# Patient Record
Sex: FEMALE | URBAN - METROPOLITAN AREA
[De-identification: names, ages, dates, MRNs, and addresses within clinical notes are randomized per-mention and may not be internally consistent; named-entity substitution may affect disease eponyms.]

---

## 2017-03-02 ENCOUNTER — OUTPATIENT (OUTPATIENT)
Dept: OUTPATIENT SERVICES | Facility: HOSPITAL | Age: 32
LOS: 1 days | Discharge: HOME | End: 2017-03-02

## 2017-06-27 DIAGNOSIS — N92.5 OTHER SPECIFIED IRREGULAR MENSTRUATION: ICD-10-CM

## 2023-02-15 ENCOUNTER — EMERGENCY (EMERGENCY)
Facility: HOSPITAL | Age: 38
LOS: 0 days | Discharge: HOME | End: 2023-02-15
Attending: EMERGENCY MEDICINE
Payer: MEDICAID

## 2023-02-15 VITALS
TEMPERATURE: 98 F | HEIGHT: 65 IN | OXYGEN SATURATION: 100 % | WEIGHT: 188.27 LBS | HEART RATE: 105 BPM | RESPIRATION RATE: 20 BRPM | DIASTOLIC BLOOD PRESSURE: 78 MMHG | SYSTOLIC BLOOD PRESSURE: 144 MMHG

## 2023-02-15 DIAGNOSIS — R10.30 LOWER ABDOMINAL PAIN, UNSPECIFIED: ICD-10-CM

## 2023-02-15 DIAGNOSIS — O26.891 OTHER SPECIFIED PREGNANCY RELATED CONDITIONS, FIRST TRIMESTER: ICD-10-CM
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
## 2023-02-15 DIAGNOSIS — R10.9 UNSPECIFIED ABDOMINAL PAIN: ICD-10-CM

## 2023-02-15 DIAGNOSIS — O21.9 VOMITING OF PREGNANCY, UNSPECIFIED: ICD-10-CM

## 2023-02-15 DIAGNOSIS — Z3A.01 LESS THAN 8 WEEKS GESTATION OF PREGNANCY: ICD-10-CM

## 2023-02-15 DIAGNOSIS — O9A.211 INJURY, POISONING AND CERTAIN OTHER CONSEQUENCES OF EXTERNAL CAUSES COMPLICATING PREGNANCY, FIRST TRIMESTER: ICD-10-CM

## 2023-02-15 DIAGNOSIS — O10.911 UNSPECIFIED PRE-EXISTING HYPERTENSION COMPLICATING PREGNANCY, FIRST TRIMESTER: ICD-10-CM

## 2023-02-15 DIAGNOSIS — Z87.42 PERSONAL HISTORY OF OTHER DISEASES OF THE FEMALE GENITAL TRACT: ICD-10-CM

## 2023-02-15 DIAGNOSIS — M79.89 OTHER SPECIFIED SOFT TISSUE DISORDERS: ICD-10-CM

## 2023-02-15 DIAGNOSIS — M54.50 LOW BACK PAIN, UNSPECIFIED: ICD-10-CM

## 2023-02-15 DIAGNOSIS — M54.9 DORSALGIA, UNSPECIFIED: ICD-10-CM
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
## 2023-02-15 DIAGNOSIS — O99.891 OTHER SPECIFIED DISEASES AND CONDITIONS COMPLICATING PREGNANCY: ICD-10-CM

## 2023-02-15 DIAGNOSIS — M25.511 PAIN IN RIGHT SHOULDER: ICD-10-CM

## 2023-02-15 LAB
ALBUMIN SERPL ELPH-MCNC: 4.4 G/DL — SIGNIFICANT CHANGE UP (ref 3.5–5.2)
ALP SERPL-CCNC: 82 U/L — SIGNIFICANT CHANGE UP (ref 30–115)
ALT FLD-CCNC: 22 U/L — SIGNIFICANT CHANGE UP (ref 0–41)
ANION GAP SERPL CALC-SCNC: 9 MMOL/L — SIGNIFICANT CHANGE UP (ref 7–14)
APPEARANCE UR: CLEAR — SIGNIFICANT CHANGE UP
AST SERPL-CCNC: 21 U/L — SIGNIFICANT CHANGE UP (ref 0–41)
BACTERIA # UR AUTO: NEGATIVE — SIGNIFICANT CHANGE UP
BASOPHILS # BLD AUTO: 0.02 K/UL — SIGNIFICANT CHANGE UP (ref 0–0.2)
BASOPHILS NFR BLD AUTO: 0.3 % — SIGNIFICANT CHANGE UP (ref 0–1)
BILIRUB DIRECT SERPL-MCNC: <0.2 MG/DL — SIGNIFICANT CHANGE UP (ref 0–0.3)
BILIRUB INDIRECT FLD-MCNC: <0.2 MG/DL — SIGNIFICANT CHANGE UP (ref 0.2–1.2)
BILIRUB SERPL-MCNC: <0.2 MG/DL — SIGNIFICANT CHANGE UP (ref 0.2–1.2)
BILIRUB UR-MCNC: NEGATIVE — SIGNIFICANT CHANGE UP
BLD GP AB SCN SERPL QL: SIGNIFICANT CHANGE UP
BUN SERPL-MCNC: 15 MG/DL — SIGNIFICANT CHANGE UP (ref 10–20)
CALCIUM SERPL-MCNC: 9 MG/DL — SIGNIFICANT CHANGE UP (ref 8.4–10.4)
CHLORIDE SERPL-SCNC: 101 MMOL/L — SIGNIFICANT CHANGE UP (ref 98–110)
CO2 SERPL-SCNC: 26 MMOL/L — SIGNIFICANT CHANGE UP (ref 17–32)
COLOR SPEC: YELLOW — SIGNIFICANT CHANGE UP
CREAT SERPL-MCNC: 0.7 MG/DL — SIGNIFICANT CHANGE UP (ref 0.7–1.5)
DIFF PNL FLD: NEGATIVE — SIGNIFICANT CHANGE UP
EGFR: 113 ML/MIN/1.73M2 — SIGNIFICANT CHANGE UP
EOSINOPHIL # BLD AUTO: 0.09 K/UL — SIGNIFICANT CHANGE UP (ref 0–0.7)
EOSINOPHIL NFR BLD AUTO: 1.3 % — SIGNIFICANT CHANGE UP (ref 0–8)
EPI CELLS # UR: 2 /HPF — SIGNIFICANT CHANGE UP (ref 0–5)
GLUCOSE SERPL-MCNC: 96 MG/DL — SIGNIFICANT CHANGE UP (ref 70–99)
GLUCOSE UR QL: NEGATIVE — SIGNIFICANT CHANGE UP
HCG SERPL-ACNC: 4323 MIU/ML — HIGH
HCT VFR BLD CALC: 34.4 % — LOW (ref 37–47)
HGB BLD-MCNC: 10.9 G/DL — LOW (ref 12–16)
HYALINE CASTS # UR AUTO: 2 /LPF — SIGNIFICANT CHANGE UP (ref 0–7)
IMM GRANULOCYTES NFR BLD AUTO: 0.4 % — HIGH (ref 0.1–0.3)
KETONES UR-MCNC: NEGATIVE — SIGNIFICANT CHANGE UP
LEUKOCYTE ESTERASE UR-ACNC: NEGATIVE — SIGNIFICANT CHANGE UP
LIDOCAIN IGE QN: 21 U/L — SIGNIFICANT CHANGE UP (ref 7–60)
LYMPHOCYTES # BLD AUTO: 1.84 K/UL — SIGNIFICANT CHANGE UP (ref 1.2–3.4)
LYMPHOCYTES # BLD AUTO: 27.3 % — SIGNIFICANT CHANGE UP (ref 20.5–51.1)
MCHC RBC-ENTMCNC: 25.1 PG — LOW (ref 27–31)
MCHC RBC-ENTMCNC: 31.7 G/DL — LOW (ref 32–37)
MCV RBC AUTO: 79.1 FL — LOW (ref 81–99)
MONOCYTES # BLD AUTO: 0.61 K/UL — HIGH (ref 0.1–0.6)
MONOCYTES NFR BLD AUTO: 9.1 % — SIGNIFICANT CHANGE UP (ref 1.7–9.3)
NEUTROPHILS # BLD AUTO: 4.15 K/UL — SIGNIFICANT CHANGE UP (ref 1.4–6.5)
NEUTROPHILS NFR BLD AUTO: 61.6 % — SIGNIFICANT CHANGE UP (ref 42.2–75.2)
NITRITE UR-MCNC: NEGATIVE — SIGNIFICANT CHANGE UP
NRBC # BLD: 0 /100 WBCS — SIGNIFICANT CHANGE UP (ref 0–0)
PH UR: 6.5 — SIGNIFICANT CHANGE UP (ref 5–8)
PLATELET # BLD AUTO: 207 K/UL — SIGNIFICANT CHANGE UP (ref 130–400)
POTASSIUM SERPL-MCNC: 3.5 MMOL/L — SIGNIFICANT CHANGE UP (ref 3.5–5)
POTASSIUM SERPL-SCNC: 3.5 MMOL/L — SIGNIFICANT CHANGE UP (ref 3.5–5)
PROT SERPL-MCNC: 7.1 G/DL — SIGNIFICANT CHANGE UP (ref 6–8)
PROT UR-MCNC: ABNORMAL
RBC # BLD: 4.35 M/UL — SIGNIFICANT CHANGE UP (ref 4.2–5.4)
RBC # FLD: 16.9 % — HIGH (ref 11.5–14.5)
RBC CASTS # UR COMP ASSIST: 1 /HPF — SIGNIFICANT CHANGE UP (ref 0–4)
SODIUM SERPL-SCNC: 136 MMOL/L — SIGNIFICANT CHANGE UP (ref 135–146)
SP GR SPEC: 1.02 — SIGNIFICANT CHANGE UP (ref 1.01–1.03)
UROBILINOGEN FLD QL: SIGNIFICANT CHANGE UP
WBC # BLD: 6.74 K/UL — SIGNIFICANT CHANGE UP (ref 4.8–10.8)
WBC # FLD AUTO: 6.74 K/UL — SIGNIFICANT CHANGE UP (ref 4.8–10.8)
WBC UR QL: 0 /HPF — SIGNIFICANT CHANGE UP (ref 0–5)

## 2023-02-15 PROCEDURE — 87086 URINE CULTURE/COLONY COUNT: CPT

## 2023-02-15 PROCEDURE — 73030 X-RAY EXAM OF SHOULDER: CPT | Mod: RT

## 2023-02-15 PROCEDURE — 86850 RBC ANTIBODY SCREEN: CPT

## 2023-02-15 PROCEDURE — 84702 CHORIONIC GONADOTROPIN TEST: CPT

## 2023-02-15 PROCEDURE — 86900 BLOOD TYPING SEROLOGIC ABO: CPT

## 2023-02-15 PROCEDURE — 86901 BLOOD TYPING SEROLOGIC RH(D): CPT

## 2023-02-15 PROCEDURE — 73030 X-RAY EXAM OF SHOULDER: CPT | Mod: 26,RT

## 2023-02-15 PROCEDURE — 99284 EMERGENCY DEPT VISIT MOD MDM: CPT | Mod: 25

## 2023-02-15 PROCEDURE — 83690 ASSAY OF LIPASE: CPT

## 2023-02-15 PROCEDURE — 85025 COMPLETE CBC W/AUTO DIFF WBC: CPT

## 2023-02-15 PROCEDURE — 36415 COLL VENOUS BLD VENIPUNCTURE: CPT

## 2023-02-15 PROCEDURE — 80048 BASIC METABOLIC PNL TOTAL CA: CPT

## 2023-02-15 PROCEDURE — 76830 TRANSVAGINAL US NON-OB: CPT | Mod: 26

## 2023-02-15 PROCEDURE — 80076 HEPATIC FUNCTION PANEL: CPT

## 2023-02-15 PROCEDURE — 76830 TRANSVAGINAL US NON-OB: CPT

## 2023-02-15 PROCEDURE — 99285 EMERGENCY DEPT VISIT HI MDM: CPT

## 2023-02-15 PROCEDURE — 81001 URINALYSIS AUTO W/SCOPE: CPT

## 2023-02-15 RX ORDER — ACETAMINOPHEN 500 MG
975 TABLET ORAL ONCE
Refills: 0 | Status: COMPLETED | OUTPATIENT
Start: 2023-02-15 | End: 2023-02-15

## 2023-02-15 NOTE — CONSULT NOTE ADULT - ASSESSMENT
37y  LMP 23 s/p fall down stairs, normal vs abnormal IUP, low suspicion for ectopic at this time, clinically and hemodynamically stable,  -repeat hCG in 2 days - scripts given x2  -ectopic and bleeding precautions discussed  -has appt with OBGYN on , advised to keep appt  -dispo per ED    Dr. Tan and Dr. Moreno

## 2023-02-15 NOTE — ED PROVIDER NOTE - CLINICAL SUMMARY MEDICAL DECISION MAKING FREE TEXT BOX
36 yo F,  with early pregnancy, 4w6d, previous pregnancies with C section, pre-eclampsia here for assessment. Patient states she slipped and fell down 3 stairs and now has mild low back pain and lower abdominal pain.    No VB, head trauma, LOC. No AC use.     On ROS notes mild bilateral LE edema and nause with NBNB vomiting today.    VS normal, exam unremarkable. Non focal neuro exam, no midline CTL spine ttp.    US demonstrates GS, no YS. Mean sac diameter is 7mm. Beta is 4300 -- patient will need repeat beta so GYN consulted, patient was provided with rx for repeat x 2.    Despite fall, has no signs of acute traumatic injury, no red flags to suggest spinal injury.     Will dc home with close monitoring, return precautions.

## 2023-02-15 NOTE — ED PROVIDER NOTE - PATIENT PORTAL LINK FT
You can access the FollowMyHealth Patient Portal offered by [facility] by registering at the following website: [email or web address]. By joining [employer]’s FollowMyHealth portal, you will also be able to view your health information using other applications (apps) compatible with our system.

## 2023-02-15 NOTE — ED ADULT NURSE NOTE - OBJECTIVE STATEMENT
Pt c/o possible pregnancy problem due to previous high risk pregnancies. Pt took a pregnancy test at home today and is seeking medical evaluation. Pt also admits to falling down steps today and wants to be seen by an MD. AOx3.
No

## 2023-02-15 NOTE — ED ADULT NURSE NOTE - CHIEF COMPLAINT QUOTE
I have 4 kids already, I have history of preeclampsia, I'm Rh negative, I have endometriosis. I took a home pregnancy test and it was positive. I have swelling in my legs, I have pain on my right side (pelvic) and plus I slid down a flight of stairs today. I have pain here (posterior hip) and upper back - patient  Patient denies hitting her head, denies LOC, denies anticoagulants. LMP 1/12/23

## 2023-02-15 NOTE — ED ADULT TRIAGE NOTE - CHIEF COMPLAINT QUOTE
I have 4 kids already, I have history of preeclampsia, I'm Rh negative, I have endometriosis. I took a home pregnancy test and it was positive. I have swelling in my legs, I have pain on my right side (pelvic) and plus I slid down a flight of stairs today. I have pain here (posterior hip) and upper back - patient  Patient denies hitting her head, denies LOC, denies anticoagulants. LMP 1/12/23
intentional

## 2023-02-15 NOTE — ED PROVIDER NOTE - PROGRESS NOTE DETAILS
Pt reports improvement in symptoms, currently asymptomatic. Pt evaluated by OB, cleared for OP follow-up. Discussed results and provided copy to pt. Pt understands to follow-up with her obgyn. Pt understands to return to ED if symptoms return/worsen. Agreeable to discharge.

## 2023-02-15 NOTE — CONSULT NOTE ADULT - SUBJECTIVE AND OBJECTIVE BOX
Chief Complaint: back pain    HPI: 37y  LMP 23 (4w6d) presenting after fall down the stairs at home, incidentally found to be pregnant. Reports back pain and right groin pain since the fall. Denies dizziness, SOB, chest pain, palpitations, abdominal/pelvic pain, vaginal bleeding, abnormal vaginal discharge, or urinary sxs. Sees Dr. Chris Ruth in [place] for OBGYN care. Unplanned and desired pregnancy.     Ob/Gyn History:                   LMP -  23                 Cycle Length - irregular  FT C/Sx4, preeclampsia with severe features with each delivery, on magnesium, told extensive scarring after last c/s  -h/o endometriosis and uterine polyp per patient  Denies history of ovarian cysts, uterine fibroids, abnormal paps, or STIs    Home Medications: denie    Allergies: denies    PAST MEDICAL & SURGICAL HISTORY:  C/S x4  endometriosis  chronic hypertension    FAMILY HISTORY: denies pertintn hx    SOCIAL HISTORY: Denies cigarette use, alcohol use, or illicit drug use    Vital Signs Last 24 Hrs  T(F): 98.1 (15 Feb 2023 01:17), Max: 98.1 (15 Feb 2023 01:17)  HR: 105 (15 Feb 2023 01:17) (105 - 105)  BP: 144/78 (15 Feb 2023 01:17) (144/78 - 144/78)  RR: 20 (15 Feb 2023 01:17) (20 - 20)  Height (cm): 165.1 (02-15-23 @ 01:17)  Weight (kg): 85.4 (02-15-23 @ 01:17)  BMI (kg/m2): 31.3 (02-15-23 @ 01:17)  BSA (m2): 1.93 (02-15-23 @ 01:17)    General Appearance - AAOx3, NAD  Heart - S1S2 regular rate and rhythm  Lung - CTA Bilaterally  Abdomen - Soft, nontender, nondistended, no rebound, no rigidity, no guarding, bowel sounds present    GYN/Pelvis:    Labia Majora - Normal  Labia Minora - Normal  Clitoris - Normal  Urethra - Normal  Vagina - Normal rugation, no lesions, no bleeding, no abnormal discharge  Cervix - Normal appearing, closed, no CMT, no blood/discharge    Uterus:  Size - 8wk sized anteverted  Tenderness - None  Mass - None  Freely mobile    Adnexa:  Masses - None  Tenderness - None      Meds:     Height (cm): 165.1 (02-15-23 @ 01:17)  Weight (kg): 85.4 (02-15-23 @ 01:17)  BMI (kg/m2): 31.3 (02-15-23 @ 01:17)  BSA (m2): 1.93 (02-15-23 @ 01:17)    LABS:                        10.9   6.74  )-----------( 207      ( 15 Feb 2023 02:55 )             34.4     HCG Quantitative, Serum: 4323.0 mIU/mL (02-15-23 @ 02:55)    ABO RH Interpretation: O NEG (02-15-23 @ 02:58)  Antibody Screen: NEG (02-15-23 @ 02:58)    02-15    136  |  101  |  15  ----------------------------<  96  3.5   |  26  |  0.7    Ca    9.0      15 Feb 2023 02:55    TPro  7.1  /  Alb  4.4  /  TBili  <0.2  /  DBili  <0.2  /  AST  21  /  ALT  22  /  AlkPhos  82  15      Urinalysis Basic - ( 15 Feb 2023 02:55 )    Color: Yellow / Appearance: Clear / S.022 / pH: x  Gluc: x / Ketone: Negative  / Bili: Negative / Urobili: <2 mg/dL   Blood: x / Protein: 30 mg/dL / Nitrite: Negative   Leuk Esterase: Negative / RBC: 1 /HPF / WBC 0 /HPF   Sq Epi: x / Non Sq Epi: 2 /HPF / Bacteria: Negative          RADIOLOGY & ADDITIONAL STUDIES:  < from: US Transvaginal (02.15.23 @ 03:30) >  FINDINGS:  Uterus: 11.6 cm x 6.3 cm x 6.7 cm. Single intrauterine gestational sac   with mean sac diameter of approximately 0.7 cm. No yolk sac visualized.    Right ovary: 2.8 cm x1.7 cm x 2.2 cm, with volume of 6 mL. Within normal   limits. Vascular flow demonstrated.  Left ovary: 2.5 cm x 1.8 cm x 1.9 cm, with volume of 5 mL. 2.0 x 1.6 x   1.8 cm heterogeneous structure in the left ovary, likely corpus luteum.   Vascular flow demonstrated.    Fluid: None.      IMPRESSION:      Single intrauterine gestational sac with mean sac diameter of   approximately 0.7 cm. No yolk sac visualized. Recommend close clinical   follow-up and serial serum beta HCG levels, with repeat short-term   ultrasound in 7 -10 days, or earlier if clinically indicated.    2.0 cm heterogeneous structure in the left ovary, likely a corpus luteum.    < end of copied text >

## 2023-02-15 NOTE — ED PROVIDER NOTE - PHYSICAL EXAMINATION
VITAL SIGNS: I have reviewed nursing notes and confirm.  CONSTITUTIONAL: 36 yo F sitting on chair; in no acute distress.  SKIN: Skin exam is warm and dry, no acute rash.  HEAD: Normocephalic; atraumatic.  EYES: PERRL, EOM intact; conjunctiva and sclera clear.  ENT: No nasal discharge; airway clear.   NECK: Supple; non tender.  CARD: S1, S2 normal; no murmurs, gallops, or rubs. Regular rate and rhythm.  RESP: No wheezes, rales or rhonchi. Speaking in full sentences.   ABD: Normal bowel sounds; soft; non-distended; Non-tender; No rebound or guarding. No CVA tenderness.  BACK: No midline TTP. (+) mild R lumbar paraspinal TTP  EXT: Normal ROM. No clubbing, cyanosis or edema. No calf TTP or swelling. (+) mild TTP to posterior aspect of R shoulder without deformity or ecchymosis. FROM. Sensation/strength intact. Radial pulses 2+.   NEURO: Alert, oriented. Grossly unremarkable. No focal deficits.

## 2023-02-15 NOTE — ED PROVIDER NOTE - NSFOLLOWUPINSTRUCTIONS_ED_ALL_ED_FT
Pregnancy    WHAT YOU NEED TO KNOW:    A normal pregnancy lasts about 40 weeks. The first trimester lasts from your last period through the 12th week of pregnancy. The second trimester lasts from the 13th week of your pregnancy through the 23rd week. The third trimester lasts from your 24th week of pregnancy until your baby is born. If you know the date of your last period, your healthcare provider can estimate your due date. You may give birth to your baby any time from 37 weeks to 2 weeks after your due date.    DISCHARGE INSTRUCTIONS:    Return to the emergency department if:   You develop a severe headache that does not go away.  You have new or increased vision changes, such as blurred or spotted vision.  You have new or increased swelling in your face or hands.  You have pain or cramping in your abdomen or low back.  You have vaginal bleeding.    Contact your healthcare provider or obstetrician if:   You have abdominal cramps, pressure, or tightening.  You have a change in vaginal discharge.  You cannot keep food or drinks down, and you are losing weight.  You have chills or a fever.  You have vaginal itching, burning, or pain.   You have yellow, green, white, or foul-smelling vaginal discharge.  You have pain or burning when you urinate, less urine than usual, or pink or bloody urine.  You have questions or concerns about your condition or care.    Medicines:     Prenatal vitamins provide some of the extra vitamins and minerals you need during pregnancy. Prenatal vitamins may also help to decrease the risk of certain birth defects.     Take your medicine as directed. Contact your healthcare provider if you think your medicine is not helping or if you have side effects. Tell him or her if you are allergic to any medicine. Keep a list of the medicines, vitamins, and herbs you take. Include the amounts, and when and why you take them. Bring the list or the pill bottles to follow-up visits. Carry your medicine list with you in case of an emergency.    Follow up with your healthcare provider or obstetrician as directed: Go to all of your prenatal visits during your pregnancy. Write down your questions so you remember to ask them during your visits.    Body changes that may occur during your pregnancy:     Breast changes you will experience include tenderness and tingling during the early part of your pregnancy. Your breasts will become larger. You may need to use a support bra. You may see a thin, yellow fluid, called colostrum, leak from your nipples during the second trimester. Colostrum is a liquid that changes to milk about 3 days after you give birth.    Skin changes and stretch marks may occur during your pregnancy. You may have red marks, called stretch marks, on your skin. Stretch marks will usually fade after pregnancy. Use lotion if your skin is dry and itchy. The skin on your face, around your nipples, and below your belly button may darken. Most of the time, your skin will return to its normal color after your baby is born.     Morning sickness is nausea and vomiting that can happen at any time of day. Avoid fatty and spicy foods. Eat small meals throughout the day instead of large meals. Kalie may help to decrease nausea. Ask your healthcare provider about other ways of decreasing nausea and vomiting.    Heartburn may be caused by changes in your hormones during pregnancy. Your growing uterus may also push your stomach upward and force stomach acid to back up into your esophagus. Eat 4 or 5 small meals each day instead of large meals. Avoid spicy foods. Avoid eating right before bedtime.    Constipation may develop during your pregnancy. To treat constipation, eat foods high in fiber such as fiber cereals, beans, fruits, vegetables, whole-grain breads, and prune juice. Get regular exercise and drink plenty of water. Your healthcare provider may also suggest a fiber supplement to soften your bowel movements. Talk to your healthcare provider before you use any medicines to decrease constipation.    Hemorrhoids are enlarged veins in the rectal area. They may cause pain, itching, and bright red bleeding from your rectum. To decrease your risk of hemorrhoids, prevent constipation and do not strain to have a bowel movement. If you have hemorrhoids, soak in a tub of warm water to ease discomfort. Ask your healthcare provider how you can treat hemorrhoids.     Leg cramps and swelling may be caused by low calcium levels or the added weight of pregnancy. Raise your legs above the level of your heart to decrease swelling. During a leg cramp, stretch or massage the muscle that has the cramp. Heat may help decrease pain and muscle spasms. Apply heat on your muscle for 20 to 30 minutes every 2 hours for as many days as directed.    Back pain may occur as your baby grows. Do not stand for long periods of time or lift heavy items. Use good posture while you stand, squat, or bend. Wear low-heeled shoes with good support. Rest may also help to relieve back pain. Ask your healthcare provider about exercises you can do to strengthen your back muscles.     Stay healthy during your pregnancy:     Eat a variety of healthy foods. Healthy foods include fruits, vegetables, whole-grain breads, low-fat dairy foods, beans, lean meats, and fish. Drink liquids as directed. Ask how much liquid to drink each day and which liquids are best for you. Limit caffeine to less than 200 milligrams each day. Limit your intake of fish to 2 servings each week. Choose fish low in mercury such as canned light tuna, shrimp, crab, salmon, cod, or tilapia. Do not eat fish high in mercury such as swordfish, tilefish, hellen mackerel, and shark.     Take prenatal vitamins as directed. Your need for certain vitamins and minerals, such as folic acid, increases during pregnancy. Prenatal vitamins provide some of the extra vitamins and minerals you need. Prenatal vitamins may also help to decrease the risk of certain birth defects.     Ask how much weight you should gain during your pregnancy. Too much or too little weight gain can be unhealthy for you and your baby.     Talk to your healthcare provider about exercise. Moderate exercise can help you stay fit. Your healthcare provider will help you plan an exercise program that is safe for you during pregnancy.     Do not smoke. Smoking increases your risk of a miscarriage and other health problems during your pregnancy. Smoking can cause your baby to be born too early or weigh less at birth. Quit smoking as soon as you think you might be pregnant. Ask your healthcare provider for information if you need help quitting.    Do not drink alcohol. Alcohol passes from your body to your baby through the placenta. It can affect your baby's brain development and cause fetal alcohol syndrome (FAS). FAS is a group of conditions that causes mental, behavior, and growth problems.     Talk to your healthcare provider before you take any medicines. Many medicines may harm your baby if you take them when you are pregnant. Do not take any medicines, vitamins, herbs, or supplements without first talking to your healthcare provider. Never use illegal or street drugs (such as marijuana or cocaine) while you are pregnant.     Safety tips:     Avoid hot tubs and saunas. Do not use a hot tub or sauna while you are pregnant, especially during your first trimester. Hot tubs and saunas may raise your baby's temperature and increase the risk of birth defects.    Avoid toxoplasmosis. This is an infection caused by eating raw meat or being around infected cat feces. It can cause birth defects, miscarriages, and other problems. Wash your hands after you touch raw meat. Make sure any meat is well-cooked before you eat it. Avoid raw eggs and unpasteurized milk. Use gloves or ask someone else to clean your cat's litter box while you are pregnant.     Ask your healthcare provider about travel. The most comfortable time to travel is during the second trimester. Ask your healthcare provider if you can travel after 36 weeks. You may not be able to travel in an airplane after 36 weeks. He may also recommend that you avoid long road trips.

## 2023-02-15 NOTE — ED PROVIDER NOTE - OBJECTIVE STATEMENT
38 yo F , LMP 23, with PMHx of endometriosis and preeclampsia presents to the ED c/o mild right sided low back pain, R shoulder pain and right lower abdominal pain. Pt states she slipped while walking down steps and slid down 3 steps on her back. She also endorses nausea, few episodes of NBNB vomiting yesterday and mild leg swelling. She has not taken any medication to improve symptoms. She has not see her OB yet for this pregnancy. She denies other complaints. Pt denies fever, chills, diarrhea, headache, dizziness, weakness, chest pain, SOB, LOC, urinary symptoms, cough, calf pain/swelling, recent travel, recent surgery.

## 2023-02-15 NOTE — ED ADULT TRIAGE NOTE - ACCOMPANIED BY
Please addend work letter to include those days.    DAVID Friend, CNP  Orthopedic Surgery         Self

## 2023-02-16 LAB
CULTURE RESULTS: SIGNIFICANT CHANGE UP
SPECIMEN SOURCE: SIGNIFICANT CHANGE UP

## 2023-02-17 NOTE — CHART NOTE - NSCHARTNOTEFT_GEN_A_CORE
PGY 1 Note    Called patient to remind her to complete b-hcg blood work today at any [facility] lab. Pt did not answer phone, left instructions to complete lab work with call back number for questions. Reminded patient go attend scheduled appt with PMD Dr. Ruth tomorrow.    Additionally, left message with Dr. Ruth's office to ask if he will assume care. Dr. Ruth not available at the time, awaiting call back. Will continue to monitor patient for now.     Dr. Luna and Dr. Allen to be aware

## 2023-02-17 NOTE — CHART NOTE - NSCHARTNOTEFT_GEN_A_CORE
PGY 1 Note    Patient presented to the ED on 2/15/23 after fall down the stairs at home, incidentally found to be pregnant. Reported back pain and right groin pain since the fall. Sees Dr. Chris Ruth in [place] for OBGYN care. Unplanned and desired pregnancy.    Labs and Sono showed the followin/15 6.74>10.9/34.4<207, BUN/Cr 15/0.7, AST/ALT , O NEG, hCG 4323    2/15 TVUS: Uterus: 11.6 cm x 6.3 cm x 6.7 cm. Single intrauterine gestational sac with mean sac diameter of approximately 0.7 cm. No yolk sac visualized. Right ovary: 2.8 cm x 1.7 cm x 2.2 cm, with volume of 6 mL. Within normal limits. Vascular flow demonstrated.Left ovary: 2.5 cm x 1.8 cm x 1.9 cm, with volume of 5 mL. 2.0 x 1.6 x 1.8 cm heterogeneous structure in the left ovary, likely corpus luteum. Vascular flow demonstrated. Fluid: None.   On 23 We spoke to Dr. Chris Ruth on the phone, discussed her course, he states that he will assume further care for this patient. We will take her off of our beta list.    Dr. Allen and Dr. Fox to be aware.

## 2023-12-01 NOTE — ED PROVIDER NOTE - PRINCIPAL DIAGNOSIS
Pt came in for EKG after starting sotalol 40mg, she is experiencing no symptoms. Please review and advise.  [address] o[address] Early stage of pregnancy

## 2024-10-03 NOTE — ED ADULT TRIAGE NOTE - BMI (KG/M2)
----- Message from Elmo Knott MD sent at 10/3/2024  1:20 PM CDT -----  Stress echo is normal, continue present management.    Called patient to convey above. Patient did not answer. Left detailed VM notifying of normal stress echo. Encouraged patient to call [phone] with any questions or concerns.    31.3